# Patient Record
Sex: FEMALE | Race: WHITE | NOT HISPANIC OR LATINO | URBAN - NONMETROPOLITAN AREA
[De-identification: names, ages, dates, MRNs, and addresses within clinical notes are randomized per-mention and may not be internally consistent; named-entity substitution may affect disease eponyms.]

---

## 2019-06-19 ENCOUNTER — IMPORTED ENCOUNTER (OUTPATIENT)
Dept: URBAN - NONMETROPOLITAN AREA CLINIC 1 | Facility: CLINIC | Age: 64
End: 2019-06-19

## 2019-06-19 PROBLEM — H52.4: Noted: 2019-06-19

## 2019-06-19 PROBLEM — H25.13: Noted: 2019-06-19

## 2019-06-19 PROBLEM — H52.03: Noted: 2019-06-19

## 2019-06-19 PROCEDURE — 92310 CONTACT LENS FITTING OU: CPT

## 2019-06-19 PROCEDURE — S0621 ROUTINE OPHTHALMOLOGICAL EXA: HCPCS

## 2019-06-19 NOTE — PATIENT DISCUSSION
Hyperopia-Discussed diagnosis with patient. Presbyopia-Discussed diagnosis with patient. Updated spec Rx given. Recommend lens that will provide comfort as well as protect safety and health of eyes. Cataract OU-Not yet surgical. -Reviewed symptoms of advancing cataract growth such as glare and halos and decreased vision.-Continue to monitor for now.  Pt will notify us if any new symptoms develop.; 's Notes: SANTOSH

## 2022-04-09 ASSESSMENT — VISUAL ACUITY
OD_CC: 20/20-2
OD_SC: J3
OS_CC: 20/40
OS_SC: J2
OD_CC: 20/30
OS_SC: 20/25

## 2022-04-09 ASSESSMENT — TONOMETRY
OS_IOP_MMHG: 16
OD_IOP_MMHG: 16